# Patient Record
Sex: MALE | Race: WHITE | NOT HISPANIC OR LATINO | Employment: UNEMPLOYED | URBAN - METROPOLITAN AREA
[De-identification: names, ages, dates, MRNs, and addresses within clinical notes are randomized per-mention and may not be internally consistent; named-entity substitution may affect disease eponyms.]

---
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
## 2020-01-07 ENCOUNTER — TRANSFERRED RECORDS (OUTPATIENT)
Dept: HEALTH INFORMATION MANAGEMENT | Facility: CLINIC | Age: 20
End: 2020-01-07

## 2020-03-02 ENCOUNTER — HEALTH MAINTENANCE LETTER (OUTPATIENT)
Age: 20
End: 2020-03-02

## 2020-03-24 ENCOUNTER — TELEPHONE (OUTPATIENT)
Dept: RHEUMATOLOGY | Facility: CLINIC | Age: 20
End: 2020-03-24

## 2020-03-24 NOTE — TELEPHONE ENCOUNTER
----- Message from Carlos Ramirez sent at 3/24/2020  1:26 PM CDT -----  Regarding: request for doctor's note  Is an  Needed: no  Callers Name: Vicky Groves Phone Number: [phone]  Relationship to Patient: mom  Best time of day to call: any  Is it ok to leave a detailed voicemail on this number: yes  Reason for Call:   Pt's mom had a coworker test positive for Covid 19. She is requesting a note from Dr. De La Fuente for work absences to be excused due to her child being immunocompromised because of taking methotrexate. If possible, she'd like the note emailed to her at: [email or web address]

## 2020-03-24 NOTE — TELEPHONE ENCOUNTER
Spoke to mom. We discussed CDC guidelines and let her know that we were unable to provide a note to stay home from work. Bill is currently on methotrexate and naproxen. He is not considered at high risk for immunosuppression given these medications. If he does show signs of the illness, he should call his PCP to be assessed. Mom in agreement and had no other questions.

## 2020-06-15 ENCOUNTER — TRANSFERRED RECORDS (OUTPATIENT)
Dept: HEALTH INFORMATION MANAGEMENT | Facility: CLINIC | Age: 20
End: 2020-06-15

## 2020-08-19 ENCOUNTER — TELEPHONE (OUTPATIENT)
Dept: RHEUMATOLOGY | Facility: CLINIC | Age: 20
End: 2020-08-19

## 2020-08-19 ENCOUNTER — VIRTUAL VISIT (OUTPATIENT)
Dept: RHEUMATOLOGY | Facility: CLINIC | Age: 20
End: 2020-08-19
Attending: PEDIATRICS
Payer: COMMERCIAL

## 2020-08-19 DIAGNOSIS — M08.80 JUVENILE IDIOPATHIC ARTHRITIS, ENTHESITIS RELATED ARTHRITIS (H): ICD-10-CM

## 2020-08-19 PROCEDURE — 84450 TRANSFERASE (AST) (SGOT): CPT | Mod: TEL | Performed by: PEDIATRICS

## 2020-08-19 RX ORDER — NAPROXEN 500 MG/1
500 TABLET ORAL DAILY
Qty: 30 TABLET | Refills: 11 | Status: SHIPPED | OUTPATIENT
Start: 2020-08-19

## 2020-08-19 NOTE — LETTER
"  8/19/2020      RE: Bill Mancilla  [address] [place] [id number]         Bill is a 19 year old man who was evaluated for follow-up in Pediatric Rheumatology clinic today.  It was a telephone visit.    The encounter diagnosis was Juvenile idiopathic arthritis, enthesitis related arthritis (H).    He is currently taking the following medications and the doses as documented.          Medications:     Current Outpatient Medications   Medication Sig Dispense Refill     methotrexate 2.5 MG tablet Take 8 tablets (20 mg) by mouth once a week 32 tablet 11     naproxen (NAPROSYN) 500 MG tablet Take 1 tablet (500 mg) by mouth daily 30 tablet 11       Bill is tolerating the medication(s) well.          Interval History:     Bill returns for scheduled follow-up by himself.    Bill is a 19 year old male who presents via telephone encounter for follow up of his diagnosis of juvenile idiopathic arthritis, enthesitis related arthritis (H). His last follow up visit was on 12/18/19. At that point, he indicated that he had some left hip pain specially after activity (snowboarding). Today he indicates that he continues to have hips pain specially after activity as well. He mentioned that skateboarding is an exacerbating activity for his hip pain. Additionally, Bill started working as a  a month ago. He indicates that his hips get \"pretty tight\" after his shifts at work. There is new onset of pain in his lower back and feet. This pain also began when he started working as a . His other joints have no pain.     Bill's most recent ophthalmologic exam was on 06/19/2020. Fundoscopic examination revealed no signs of inflammation. He has no vision changes.     There are no recent updates in his medications. He takes 1 tablet of 500 mg naproxen daily and 8 tablets of 2.5 mg methotrexate weekly. Occassionally, he would take 2 tablets of naproxen if hip pain merits it. There are no updates to his current medical " history.     Bill continues to enjoy skateboarding and snowboarding in his leisure time. He will take this fall semester off from college ([employer]) and plans to go back after that.          Review of Systems:     A comprehensive review of system was performed and was negative except from what is listed above.           Examination:     In general Bill was in good spirits via phone visit. Unfortunately, a physical examination was not able to be performed at this time due to the visit being via phone.          Laboratory Investigations:     Laboratory investigations were scheduled to be ready for this visit but patient was unable to get them. Labs are pending now and will be reported in a separate letter.         Impression:     Bill is a 19 year old  with   1. Juvenile idiopathic arthritis, enthesitis related arthritis (H)      At this point his disease is under good control.  We suggest taking 2 tablets of 500 mg naproxen daily for 2 weeks to evaluate if this decreases the onset of hip pain associated with activity. Additionally, we encourage the patient to continue stretching activities. His recent back and feet pain is probably due to his recently started job.              Plan:     1. Take 2 tablets daily of 500mg Naproxen for 2 weeks to evaluate if this helps with the hip pain.  2. Continue methotrexate as indicated.   3. Continue stretching before and after exacerbating activities of hips pain.   4. Follow labs were ordered and will be faxed to us: CBC/diff, creatinine, AST, ALT, CRP, and ESR.  5. Continue screening eye exams for uveitis every 12 months.  6. Follow up in clinic in 6 months.    The patient was seen and discussed under the supervision of the attending physician Dr. Art Pearce.     Félix Szymanski, MS3  [facility]        I, Art Pearce MD PhD, was on the entire telephone visit and agree with the Student's findings and plan of care as documented in the Student's note.    Art  RACHEL Pearce MD, PhD  , Pediatric Rheumatology    START TIME: 11:12  STOP TIME: 11:25    CC  SELF, REFERRED    Copy to patient  EVER FULTON Alan  [address] [place] [phone]

## 2020-08-19 NOTE — PROGRESS NOTES
"Bill is a 19 year old man who was evaluated for follow-up in Pediatric Rheumatology clinic today.  It was a telephone visit.    The encounter diagnosis was Juvenile idiopathic arthritis, enthesitis related arthritis (H).    He is currently taking the following medications and the doses as documented.          Medications:     Current Outpatient Medications   Medication Sig Dispense Refill     methotrexate 2.5 MG tablet Take 8 tablets (20 mg) by mouth once a week 32 tablet 11     naproxen (NAPROSYN) 500 MG tablet Take 1 tablet (500 mg) by mouth daily 30 tablet 11       Bill is tolerating the medication(s) well.          Interval History:     Bill returns for scheduled follow-up by himself.    Bill is a 19 year old male who presents via telephone encounter for follow up of his diagnosis of juvenile idiopathic arthritis, enthesitis related arthritis (H). His last follow up visit was on 12/18/19. At that point, he indicated that he had some left hip pain specially after activity (snowboarding). Today he indicates that he continues to have hips pain specially after activity as well. He mentioned that skateboarding is an exacerbating activity for his hip pain. Additionally, Bill started working as a  a month ago. He indicates that his hips get \"pretty tight\" after his shifts at work. There is new onset of pain in his lower back and feet. This pain also began when he started working as a . His other joints have no pain.     Bill's most recent ophthalmologic exam was on 06/19/2020. Fundoscopic examination revealed no signs of inflammation. He has no vision changes.     There are no recent updates in his medications. He takes 1 tablet of 500 mg naproxen daily and 8 tablets of 2.5 mg methotrexate weekly. Occassionally, he would take 2 tablets of naproxen if hip pain merits it. There are no updates to his current medical history.     Bill continues to enjoy skateboarding and snowboarding in his leisure time. " He will take this fall semester off from college ([employer]) and plans to go back after that.          Review of Systems:     A comprehensive review of system was performed and was negative except from what is listed above.           Examination:     In general Bill was in good spirits via phone visit. Unfortunately, a physical examination was not able to be performed at this time due to the visit being via phone.          Laboratory Investigations:     Laboratory investigations were scheduled to be ready for this visit but patient was unable to get them. Labs are pending now and will be reported in a separate letter.         Impression:     Bill is a 19 year old  with   1. Juvenile idiopathic arthritis, enthesitis related arthritis (H)      At this point his disease is under good control.  We suggest taking 2 tablets of 500 mg naproxen daily for 2 weeks to evaluate if this decreases the onset of hip pain associated with activity. Additionally, we encourage the patient to continue stretching activities. His recent back and feet pain is probably due to his recently started job.              Plan:     1. Take 2 tablets daily of 500mg Naproxen for 2 weeks to evaluate if this helps with the hip pain.  2. Continue methotrexate as indicated.   3. Continue stretching before and after exacerbating activities of hips pain.   4. Follow labs were ordered and will be faxed to us: CBC/diff, creatinine, AST, ALT, CRP, and ESR.  5. Continue screening eye exams for uveitis every 12 months.  6. Follow up in clinic in 6 months.    The patient was seen and discussed under the supervision of the attending physician Dr. Art Pearce.     Félix Szymanski, MS3  [facility]        I, Art Pearce MD PhD, was on the entire telephone visit and agree with the Student's findings and plan of care as documented in the Student's note.    Art Pearce MD, PhD  , Pediatric Rheumatology    START TIME:  11:12  STOP TIME: 11:25    CC  SELF, REFERRED    Copy to patient  EVER FULTON Alan  [address] [place] [phone]

## 2020-08-19 NOTE — NURSING NOTE
Chief Complaint   Patient presents with     Follow Up     ISAIAH     There were no vitals filed for this visit.  Claudia Gagnon LPN  August 19, 2020

## 2020-08-19 NOTE — PROGRESS NOTES
"Bill Mancilla is a 19 year old male who is being evaluated via a billable telephone visit.      The patient has been notified of following:     \"This telephone visit will be conducted via a call between you and your physician/provider. We have found that certain health care needs can be provided without the need for a physical exam.  This service lets us provide the care you need with a short phone conversation.  If a prescription is necessary we can send it directly to your pharmacy.  If lab work is needed we can place an order for that and you can then stop by our lab to have the test done at a later time.    Telephone visits are billed at different rates depending on your insurance coverage. During this emergency period, for some insurers they may be billed the same as an in-person visit.  Please reach out to your insurance provider with any questions.    If during the course of the call the physician/provider feels a telephone visit is not appropriate, you will not be charged for this service.\"    Patient has given verbal consent for Telephone visit?  Yes    What phone number would you like to be contacted at? [phone]    How would you like to obtain your AVS? Magdalene Gagnon LPN        "

## 2020-12-14 ENCOUNTER — HEALTH MAINTENANCE LETTER (OUTPATIENT)
Age: 20
End: 2020-12-14

## 2021-01-04 ENCOUNTER — TELEPHONE (OUTPATIENT)
Dept: RHEUMATOLOGY | Facility: CLINIC | Age: 21
End: 2021-01-04

## 2021-01-04 NOTE — TELEPHONE ENCOUNTER
Left message for mom that Bill is overdue for labs. Labs need to be done before methotrexate can be refilled. I asked that the labs be done as soon as able. If labs have been done, I asked that the results are faxed to our department.     I also tried Bill's phone and voicemail full.

## 2021-01-15 ENCOUNTER — DOCUMENTATION ONLY (OUTPATIENT)
Dept: RHEUMATOLOGY | Facility: CLINIC | Age: 21
End: 2021-01-15

## 2021-01-15 DIAGNOSIS — M08.80 JUVENILE IDIOPATHIC ARTHRITIS, ENTHESITIS RELATED ARTHRITIS (H): ICD-10-CM

## 2021-01-15 LAB
ABSOLUTE LYMPHOCYTES (EXTERNAL): 1.7 (ref 1–3.2)
ABSOLUTE NEUTROPHILS (EXTERNAL): 1.9 (ref 1–7)
ALT SERPL-CCNC: 14 U/L (ref 8–45)
AST SERPL-CCNC: 20 U/L (ref 5–41)
CREATININE (EXTERNAL): 0.86 (ref 0.72–1.25)
CRP INFLAMMATION (EXTERNAL): 0.4 (ref 0.2–8)
ERYTHROCYTE [SEDIMENTATION RATE] IN BLOOD: 1 MM/H (ref 0–15)
HEMOGLOBIN: 14.9 G/DL (ref 12.9–16.9)
PLATELET # BLD AUTO: 198 10^9/L (ref 130–368)
WBC # BLD AUTO: 4.2 10^9/L (ref 3.3–10.9)

## 2021-04-18 ENCOUNTER — HEALTH MAINTENANCE LETTER (OUTPATIENT)
Age: 21
End: 2021-04-18

## 2021-10-02 ENCOUNTER — HEALTH MAINTENANCE LETTER (OUTPATIENT)
Age: 21
End: 2021-10-02

## 2022-05-14 ENCOUNTER — HEALTH MAINTENANCE LETTER (OUTPATIENT)
Age: 22
End: 2022-05-14

## 2022-09-03 ENCOUNTER — HEALTH MAINTENANCE LETTER (OUTPATIENT)
Age: 22
End: 2022-09-03

## 2023-04-27 NOTE — PROGRESS NOTES
Rheumatology History:   Date of symptom onset:  1/1/2009  Date of first visit to center:  10/31/2012  Date of ISAIAH diagnosis:  12/1/2011  ILAR category:  enthesitis-related arthritis          Ophthalmology History:   Iritis/Uveitis Comorbidity:No flowsheet data found.  Date of last eye exam: 8/1/2018         Medications:     Current Outpatient Medications   Medication Sig Dispense Refill     methotrexate 2.5 MG tablet Take 8 tablets (20 mg) by mouth once a week 32 tablet 11     naproxen (NAPROSYN) 500 MG tablet Take 1 tablet (500 mg) by mouth daily 30 tablet 11       Bill is taking the medications regulalry and tolerating them well.           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  Bill was last seen in our clinic on  12/5/2018 and returns today.  The encounter diagnosis was Juvenile idiopathic arthritis, enthesitis related arthritis (H).      He reports that he has some stiffness in his thighs and hips after sitting with legs crossed.  This is worse over the past month, perhaps because he has been stretching less since he graduated from high school about a month ago.  He continues in [employer] but only practices/stretches two days per week.  He also had a fall and injured his back.    He reports dry eyes.  They are not itchy.  He denies dry mouth.  He uses lubricating eye drops. He saw the eye doctor 10 months ago but did not discuss this issue.    His health is otherwise good.    He will be attending [employer] starting in the fall.  " He hopes to pole vault for the track and field team, but needs to clear 15 feet first.         Review of Systems:     A comprehensive review of systems was performed and was negative apart from that listed above.    Information per our standardized questionnaire is as below:   Self Report  (COIN) Patient Pain Status: 2  (COIN) Patient Global Assessment Of Disease Activity: 1  Score Reported By: Self  (COIN) Patient Highest Level Of Education: high school graduate/GED  Arthritis History  (COIN) Morning stiffness in the past week: 15 minutes or less  Has your arthritis stopped from trying any athletic or rigorous activities, or interfaced with your ability to do these activities: No  Have you been limited your ability to do normal daily activities in the past week: No  Did you needed help from other people to do normal activities in the past week: No  Have you used any aids or devices to help you do normal daily activities in the past week: No            Examination:   Blood pressure 123/76, pulse 92, temperature 97.6  F (36.4  C), temperature source Oral, height 1.789 m (5' 10.43\"), weight 73.4 kg (161 lb 13.1 oz).  65 %ile based on CDC (Boys, 2-20 Years) weight-for-age data based on Weight recorded on 6/19/2019.  Blood pressure percentiles are not available for patients who are 18 years or older.    In general Bill was well appearing and in good spirits.   HEENT:  Pupils were equal, round and reactive to light.  Nose normal.  Oropharynx moist and pink with no intraoral lesions.  NECK:  Supple, no lymphadenopathy.  CHEST:  Clear to auscultation.  HEART:  Regular rate and rhythm.  No murmur.  ABDOMEN:  Soft, non-tender, no hepatosplenomegaly.   SKIN:  Normal.    JA Exam Details:  Axial Skeleton   Normal.  His back flexes normally, and in fact he can put his palms on the floor with his knees in full extension..  Upper Extremity   Normal.  Lower Extremity   He has mildly decreased external rotation of both hips, but " without pain.  He has large quadriceps.  Entheses   No Achilles tendon or plantar fascia tenderness.   No patellar tendon tenderness.           Laboratory Investigations:     Office Visit on 06/19/2019   Component Date Value Ref Range Status     WBC 06/19/2019 4.3  4.0 - 11.0 10e9/L Final     RBC Count 06/19/2019 4.65  4.4 - 5.9 10e12/L Final     Hemoglobin 06/19/2019 13.9  13.3 - 17.7 g/dL Final     Hematocrit 06/19/2019 40.6  40.0 - 53.0 % Final     MCV 06/19/2019 87  78 - 100 fl Final     MCH 06/19/2019 29.9  26.5 - 33.0 pg Final     MCHC 06/19/2019 34.2  31.5 - 36.5 g/dL Final     RDW 06/19/2019 13.0  10.0 - 15.0 % Final     Platelet Count 06/19/2019 175  150 - 450 10e9/L Final     Diff Method 06/19/2019 Automated Method   Final     % Neutrophils 06/19/2019 40.2  % Final     % Lymphocytes 06/19/2019 41.4  % Final     % Monocytes 06/19/2019 16.5  % Final     % Eosinophils 06/19/2019 1.4  % Final     % Basophils 06/19/2019 0.5  % Final     % Immature Granulocytes 06/19/2019 0.0  % Final     Nucleated RBCs 06/19/2019 0  0 /100 Final     Absolute Neutrophil 06/19/2019 1.7  1.6 - 8.3 10e9/L Final     Absolute Lymphocytes 06/19/2019 1.8  0.8 - 5.3 10e9/L Final     Absolute Monocytes 06/19/2019 0.7  0.0 - 1.3 10e9/L Final     Absolute Eosinophils 06/19/2019 0.1  0.0 - 0.7 10e9/L Final     Absolute Basophils 06/19/2019 0.0  0.0 - 0.2 10e9/L Final     Abs Immature Granulocytes 06/19/2019 0.0  0 - 0.4 10e9/L Final     Absolute Nucleated RBC 06/19/2019 0.0   Final     CRP Inflammation 06/19/2019 <2.9  0.0 - 8.0 mg/L Final     Bilirubin Direct 06/19/2019 0.2  0.0 - 0.2 mg/dL Final     Bilirubin Total 06/19/2019 0.9  0.2 - 1.3 mg/dL Final     Albumin 06/19/2019 4.2  3.4 - 5.0 g/dL Final     Protein Total 06/19/2019 7.2  6.8 - 8.8 g/dL Final     Alkaline Phosphatase 06/19/2019 83  65 - 260 U/L Final     ALT 06/19/2019 31  0 - 50 U/L Final     AST 06/19/2019 23  0 - 35 U/L Final     Sed Rate 06/19/2019 4  0 - 15 mm/h Final      Creatinine 06/19/2019 0.77  0.50 - 1.00 mg/dL Final     GFR Estimate 06/19/2019 >90  >60 mL/min/[1.73_m2] Final    Comment: Non  GFR Calc  Starting 12/18/2018, serum creatinine based estimated GFR (eGFR) will be   calculated using the Chronic Kidney Disease Epidemiology Collaboration   (CKD-EPI) equation.       GFR Estimate If Black 06/19/2019 >90  >60 mL/min/[1.73_m2] Final    Comment:  GFR Calc  Starting 12/18/2018, serum creatinine based estimated GFR (eGFR) will be   calculated using the Chronic Kidney Disease Epidemiology Collaboration   (CKD-EPI) equation.                Assessment:   Bill is a 18 year old  with   1. Juvenile idiopathic arthritis, enthesitis related arthritis (H)          Change Since Last Visit: Same  ACR Functional Class: Normal  (COIN) Provider Global Assessment Of Disease Activity: 0.5  (This is measured on the scale of 0 - 10)             At this point his disease is under good control.  I suggested trying to stretch more.  I also suggested he try taking the naproxen twice daily for a week or so to see if this helps.  If so, we could increase the scheduled dose to twice daily.    The lab values today are reassuring with no evidence of systemic inflammation or medication-related toxicity.         Plan:   1. Increase naproxen to 500 mg twice daily for 1-2 weeks as a trial.  2. Continue methotrexate as prescribed.  3. Stretch.  4. Continue screening eye exams for uveitis every 12 months.  He should go now to address the dry eyes.  5. Follow up in 6 months.      It is a pleasure to continue to participate in Bill's care.  Please feel free to contact me with any questions or concerns you have regarding Bill's care.  I can be reached through our main office at [phone] or our paging  at [phone].    Art Pearce MD, PhD  , Pediatric Rheumatology    CC  CC  Patient Care Team:  Arsen Sarkar as PCP - General  [place]  Art Singh MD PhD as MD (Pediatric Rheumatology)  MRAIBELL LIRIANO    Copy to patient  EVER FULTON Alan  [address] [place] [phone]                 denies pain/discomfort (Rating = 0)

## 2023-06-03 ENCOUNTER — HEALTH MAINTENANCE LETTER (OUTPATIENT)
Age: 23
End: 2023-06-03